# Patient Record
Sex: FEMALE | Race: WHITE
[De-identification: names, ages, dates, MRNs, and addresses within clinical notes are randomized per-mention and may not be internally consistent; named-entity substitution may affect disease eponyms.]

---

## 2017-03-31 ENCOUNTER — HOSPITAL ENCOUNTER (OUTPATIENT)
Dept: HOSPITAL 47 - LABWHC1 | Age: 42
End: 2017-03-31
Payer: MEDICAID

## 2017-03-31 DIAGNOSIS — L70.8: Primary | ICD-10-CM

## 2017-03-31 PROCEDURE — 84132 ASSAY OF SERUM POTASSIUM: CPT

## 2017-03-31 PROCEDURE — 36415 COLL VENOUS BLD VENIPUNCTURE: CPT

## 2017-06-09 ENCOUNTER — HOSPITAL ENCOUNTER (OUTPATIENT)
Dept: HOSPITAL 47 - RADCTMAIN | Age: 42
Discharge: HOME | End: 2017-06-09
Payer: MEDICAID

## 2017-06-09 DIAGNOSIS — N39.0: Primary | ICD-10-CM

## 2017-06-09 DIAGNOSIS — Z88.0: ICD-10-CM

## 2017-06-09 LAB
BUN SERPL-SCNC: 12 MG/DL (ref 7–17)
NON-AFRICAN AMERICAN GFR(MDRD): >60

## 2017-06-09 PROCEDURE — 82565 ASSAY OF CREATININE: CPT

## 2017-06-09 PROCEDURE — 74400 UROGRAPHY IV +-KUB TOMOG: CPT

## 2017-06-09 PROCEDURE — 36415 COLL VENOUS BLD VENIPUNCTURE: CPT

## 2017-06-09 PROCEDURE — 74178 CT ABD&PLV WO CNTR FLWD CNTR: CPT

## 2017-06-09 PROCEDURE — 84520 ASSAY OF UREA NITROGEN: CPT

## 2017-06-09 NOTE — CT
EXAMINATION TYPE: CT urogram wo/w con

 

DATE OF EXAM: 6/9/2017

 

COMPARISON: NONE

 

HISTORY: Frequent UTI x 1+ years.

 

CT DLP: 1597.00 mGycm

Automated exposure control for dose reduction was used.

 

CONTRAST: 

Performed without and with IV Contrast, patient injected with 100 mL of Omnipaque 300.

 

FINDINGS: 

Lung bases are clear. There is no pleural effusion. Heart size is normal.

 

Liver spleen pancreas gallbladder appear normal. Bile ducts are not dilated. There is no adrenal mass
. There is normal contrast opacification of the kidneys. Kidneys of normal size. I see no atrophy. Th
ere is no evidence of a calculus. The right renal pelvis is larger than the left and this is probably
 normal variation. There is no retroperitoneal adenopathy. There is no ascites. Bladder distends smoo
thly. There is no sign of a pelvic mass. I see no intestinal wall thickening. There are no dilated lo
ops. I see no bony destructive process. There is no sign of pelvic lymphadenopathy. 

 

IMPRESSION: 

NEGATIVE CT SCAN OF THE ABDOMEN AND PELVIS. NO EVIDENCE OF RENAL STONE OR OBSTRUCTION. NO EVIDENCE OF
 A RENAL MASS.

## 2017-08-17 ENCOUNTER — HOSPITAL ENCOUNTER (OUTPATIENT)
Dept: HOSPITAL 47 - RADMAMWWP | Age: 42
Discharge: HOME | End: 2017-08-17
Payer: MEDICAID

## 2017-08-17 DIAGNOSIS — Z12.31: Primary | ICD-10-CM

## 2017-08-18 NOTE — MM
Reason for exam: screening  (asymptomatic).

Last mammogram was performed 1 year ago.



History:

Family history of breast cancer in mother at age 48.

Retro-pectoral saline implants in both breasts, 2004.

Taking hormonal contraceptives for 22 years beginning at age 15.



Physical Findings:

A clinical breast exam by your physician is recommended on an annual basis and 

results should be correlated with mammographic findings.



MG Screening Mammo Implant/CAD

Bilateral CC and MLO view(s) were taken.

Prior study comparison: August 10, 2016, bilateral MG screening mammo implant/CAD.

July 30, 2012, CAD bilateral diagnostic mammogram.

The breast tissue is extremely dense which could obscure a lesion on mammography. 

There are three asymmetries in the left breast, medial 9.4mm, central 4.3mm and 

lateral 7.8mm.





ASSESSMENT: Incomplete: need additional imaging evaluation, BI-RAD 0



RECOMMENDATION:

Special view mammogram of the left breast.



If lesion persists on supplemental views, image directed ultrasound is 

recommended.



[facility] will attempt to contact patient to return for supplemental 

views and ultrasound if indicated.

## 2017-09-30 ENCOUNTER — HOSPITAL ENCOUNTER (OUTPATIENT)
Dept: HOSPITAL 47 - LABWHC1 | Age: 42
Discharge: HOME | End: 2017-09-30
Attending: PHYSICIAN ASSISTANT
Payer: MEDICAID

## 2017-09-30 DIAGNOSIS — L70.8: Primary | ICD-10-CM

## 2017-09-30 PROCEDURE — 36415 COLL VENOUS BLD VENIPUNCTURE: CPT

## 2017-09-30 PROCEDURE — 84132 ASSAY OF SERUM POTASSIUM: CPT

## 2017-11-24 ENCOUNTER — HOSPITAL ENCOUNTER (OUTPATIENT)
Dept: HOSPITAL 47 - RADMAMWWP | Age: 42
Discharge: HOME | End: 2017-11-24
Payer: MEDICAID

## 2017-11-24 DIAGNOSIS — R92.8: Primary | ICD-10-CM

## 2017-11-24 PROCEDURE — 76641 ULTRASOUND BREAST COMPLETE: CPT

## 2017-11-27 NOTE — MM
Reason for exam: follow-up at short interval from prior study.

Last mammogram was performed 3 months ago.



History:

Family history of breast cancer in mother at age 48.

Retro-pectoral saline implants in both breasts, 2004.

Taking hormonal contraceptives for 22 years beginning at age 15.



Physical Findings:

Nurse did not find any significant physical abnormalities on exam.



MG Work Up Jhonatan W/Imp W/CAD L

ML, ID, and spot compression CC view(s) were taken of the left breast.

Prior study comparison: August 17, 2017, bilateral MG screening mammo implant/CAD.

August 10, 2016, bilateral MG screening mammo implant/CAD.

The breast tissue is extremely dense which could obscure a lesion on mammography. 

The questioned asymmetries do not clearly persist but could be obscured by the 

very dense tissues. Ultrasound is recommended.



These results were verbally communicated with the patient and result sheet given 

to the patient on 11/24/17.





ASSESSMENT: Incomplete: need additional imaging evaluation, BI-RAD 0



RECOMMENDATION:

Ultrasound of the left breast.

## 2017-11-27 NOTE — USB
Reason for exam: additional evaluation requested from abnormal screening.



History:

Family history of breast cancer in mother at age 48.

Retro-pectoral saline implants in both breasts, 2004.

Taking hormonal contraceptives for 22 years beginning at age 15.



US Breast Workup LT

Left breast ultrasound includes all four quadrants, the retroareolar region and 

axilla. Finding demonstrates a 4 x 2 x 4mm oval, cystic lesion at 4 o'clock.



These results were verbally communicated with the patient and result sheet given 

to the patient on 11/24/17.





ASSESSMENT: Benign, BI-RAD 2



RECOMMENDATION:

Return to routine screening mammogram schedule for both breasts.

## 2019-02-16 ENCOUNTER — HOSPITAL ENCOUNTER (OUTPATIENT)
Dept: HOSPITAL 47 - LABWHC1 | Age: 44
End: 2019-02-16
Attending: PHYSICIAN ASSISTANT
Payer: MEDICAID

## 2019-02-16 DIAGNOSIS — L70.8: Primary | ICD-10-CM

## 2019-02-16 PROCEDURE — 36415 COLL VENOUS BLD VENIPUNCTURE: CPT

## 2019-02-16 PROCEDURE — 84132 ASSAY OF SERUM POTASSIUM: CPT
